# Patient Record
Sex: FEMALE
[De-identification: names, ages, dates, MRNs, and addresses within clinical notes are randomized per-mention and may not be internally consistent; named-entity substitution may affect disease eponyms.]

---

## 2018-05-16 LAB
ALBUMIN SERPL-MCNC: 4.2 G/DL (ref 3.5–5)
ALBUMIN/GLOB SERPL: 1.2 {RATIO} (ref 0.8–2)
ALP SERPL-CCNC: 81 IU/L (ref 40–150)
ALT SERPL-CCNC: 66 IU/L (ref 0–55)
ANION GAP SERPL CALC-SCNC: 14.8 MMOL/L (ref 8–16)
BASOPHILS # BLD AUTO: 0 10*3/UL (ref 0–0.1)
BASOPHILS NFR BLD AUTO: 0.6 % (ref 0–1)
BILIRUB UR QL: NEGATIVE
BUN SERPL-MCNC: 13 MG/DL (ref 7–26)
BUN/CREAT SERPL: 18 (ref 6–25)
CALCIUM SERPL-MCNC: 9.8 MG/DL (ref 8.4–10.2)
CHLORIDE SERPL-SCNC: 107 MMOL/L (ref 98–107)
CLARITY UR: (no result)
CO2 SERPL-SCNC: 25 MMOL/L (ref 22–29)
COLOR UR: YELLOW
DEPRECATED NEUTROPHILS # BLD AUTO: 3.4 10*3/UL (ref 2.1–6.9)
EGFRCR SERPLBLD CKD-EPI 2021: > 60 ML/MIN (ref 60–?)
EOSINOPHIL # BLD AUTO: 0.1 10*3/UL (ref 0–0.4)
EOSINOPHIL NFR BLD AUTO: 1.3 % (ref 0–6)
ERYTHROCYTE [DISTWIDTH] IN CORD BLOOD: 12 % (ref 11.7–14.4)
GLOBULIN PLAS-MCNC: 3.4 G/DL (ref 2.3–3.5)
GLUCOSE SERPLBLD-MCNC: 118 MG/DL (ref 74–118)
HCT VFR BLD AUTO: 40.6 % (ref 34.2–44.1)
HGB BLD-MCNC: 13.6 G/DL (ref 12–16)
KETONES UR QL STRIP.AUTO: NEGATIVE
LEUKOCYTE ESTERASE UR QL STRIP.AUTO: (no result)
LYMPHOCYTES # BLD: 2.7 10*3/UL (ref 1–3.2)
LYMPHOCYTES NFR BLD AUTO: 39.2 % (ref 18–39.1)
MCH RBC QN AUTO: 31.6 PG (ref 28–32)
MCHC RBC AUTO-ENTMCNC: 33.5 G/DL (ref 31–35)
MCV RBC AUTO: 94.4 FL (ref 81–99)
MONOCYTES # BLD AUTO: 0.6 10*3/UL (ref 0.2–0.8)
MONOCYTES NFR BLD AUTO: 8.2 % (ref 4.4–11.3)
NEUTS SEG NFR BLD AUTO: 50.4 % (ref 38.7–80)
NITRITE UR QL STRIP.AUTO: NEGATIVE
PLATELET # BLD AUTO: 285 X10E3/UL (ref 140–360)
POTASSIUM SERPL-SCNC: 4.8 MMOL/L (ref 3.5–5.1)
PROT UR QL STRIP.AUTO: NEGATIVE
RBC # BLD AUTO: 4.3 X10E6/UL (ref 3.6–5.1)
SODIUM SERPL-SCNC: 142 MMOL/L (ref 136–145)
SP GR UR STRIP: 1.02 (ref 1.01–1.02)
UROBILINOGEN UR STRIP-MCNC: 0.2 MG/DL (ref 0.2–1)

## 2018-05-16 NOTE — DIAGNOSTIC IMAGING REPORT
PROCEDURE:CHEST 2 VIEWS

TECHNIQUE:PA and lateral chest

INDICATION:Preoperative evaluation

COMPARISON:None.

 

FINDINGS:

Calcified granuloma in the left midlung. Otherwise, clear lungs. No 

pleural effusions. Normal cardiomediastinal silhouette and pulmonary 

vasculature. Intact skeleton with mild degenerative disc disease.

 

 

CONCLUSION:

No acute abnormality.

 

 

 

Dictated by:  Kwesi Cisneros M.D. on 5/16/2018 at 11:32     

Electronically approved by:  Kwesi Cisneros M.D. on 5/16/2018 at 

11:32

## 2018-05-18 ENCOUNTER — HOSPITAL ENCOUNTER (OUTPATIENT)
Dept: HOSPITAL 88 - OR | Age: 68
Discharge: HOME | End: 2018-05-18
Attending: SURGERY
Payer: COMMERCIAL

## 2018-05-18 DIAGNOSIS — Z01.810: ICD-10-CM

## 2018-05-18 DIAGNOSIS — E11.9: ICD-10-CM

## 2018-05-18 DIAGNOSIS — Z79.84: ICD-10-CM

## 2018-05-18 DIAGNOSIS — M19.90: ICD-10-CM

## 2018-05-18 DIAGNOSIS — K76.0: ICD-10-CM

## 2018-05-18 DIAGNOSIS — K21.9: ICD-10-CM

## 2018-05-18 DIAGNOSIS — Z01.812: ICD-10-CM

## 2018-05-18 DIAGNOSIS — Z01.818: ICD-10-CM

## 2018-05-18 DIAGNOSIS — I10: ICD-10-CM

## 2018-05-18 DIAGNOSIS — K80.10: Primary | ICD-10-CM

## 2018-05-18 DIAGNOSIS — K82.8: ICD-10-CM

## 2018-05-18 PROCEDURE — 71046 X-RAY EXAM CHEST 2 VIEWS: CPT

## 2018-05-18 PROCEDURE — 80053 COMPREHEN METABOLIC PANEL: CPT

## 2018-05-18 PROCEDURE — 88304 TISSUE EXAM BY PATHOLOGIST: CPT

## 2018-05-18 PROCEDURE — 93005 ELECTROCARDIOGRAM TRACING: CPT

## 2018-05-18 PROCEDURE — 81003 URINALYSIS AUTO W/O SCOPE: CPT

## 2018-05-18 PROCEDURE — 82948 REAGENT STRIP/BLOOD GLUCOSE: CPT

## 2018-05-18 PROCEDURE — 85025 COMPLETE CBC W/AUTO DIFF WBC: CPT

## 2018-05-18 PROCEDURE — 36415 COLL VENOUS BLD VENIPUNCTURE: CPT

## 2018-05-18 PROCEDURE — 47562 LAPAROSCOPIC CHOLECYSTECTOMY: CPT

## 2018-05-18 NOTE — OPERATIVE REPORT
DATE OF PROCEDURE:  May 18, 2018 



PREOPERATIVE DIAGNOSIS:  Cholecystitis and cholelithiasis.



POSTOPERATIVE DIAGNOSIS:  Cholecystitis and cholelithiasis.



OPERATION PERFORMED:  Laparoscopic cholecystectomy.  



ANESTHESIA:  General.



COMPLICATIONS:  None.



ESTIMATED BLOOD LOSS:  Minimal.



DESCRIPTION OF PROCEDURE:  With the patient lying in bed in the supine 

position, under good general endotracheal anesthesia, the abdomen was 

prepped with Betadine solution and draped in the usual manner.  A Veress 

needle was introduced into the umbilicus, and pneumoperitoneum was 

established without any difficulty.  An 11-mm trocar was placed into the 

umbilicus, and a 10-mm video laparoscope was placed into the intraabdominal 

cavity.  Under direct vision, three 5-mm trocars were placed in the right 

subcostal region.  Video laparoscopy at this point revealed the liver to 

have significant fatty infiltration.  The gallbladder was totally covered 

up with adhesions all the way up to the top.  The rest of the abdominal 

exploration was otherwise within normal limits.  The adhesions to the 

gallbladder were then slowly and carefully taken down.  The peritoneum 

overlying the neck of the gallbladder was then opened, and the cystic duct 

was identified.  The cystic duct was followed to its junction with the 

common duct.  The cystic duct was then circumferentially dissected away 

from the common duct, doubly clipped and divided.  The cystic artery was 

similarly doubly clipped and divided.  The gallbladder was then slowly and 

carefully taken off the liver bed using the cautery scissors, and perfect 

hemostasis was ascertained.  The gallbladder was placed in a pouch and 

removed through the umbilicus without any difficulty.  Video laparoscopy 

was then again carried out.  The liver bed was found to be perfectly dry.  

All of the excess fluid was aspirated.  The pneumoperitoneum was evacuated, 

and all the trocars were removed under direct vision.  The midline fascia 

at the umbilicus was then closed with figure-of-eight of #0 Vicryl.  All 

layers were infiltrated on the way out with solution of 1/4 percent 

Marcaine.  Subcutaneous tissue was approximated with 3-0 Vicryl, and the 

skin was closed with subcuticular 5-0 Vicryl.  Benzoin, Steri-Strips and 

Band-Aids were applied.  The sponge, lap and needle count was correct.  

Patient tolerated the procedure well and returned to the recovery room in 

stable condition.  



  





DD:  05/18/2018 11:58

DT:  05/18/2018 12:54

Job#:  [id number]

## 2018-05-18 NOTE — XMS REPORT
Patient Summary Document

 Created on: 2018



KELBY UGARTE

External Reference #: [id number]

: 1950

Sex: Female



Demographics







 Address  [address] [id number]

 

 Home Phone  [phone]

 

 Preferred Language  Unknown

 

 Marital Status  Unknown

 

 Gnosticist Affiliation  Unknown

 

 Race  Unknown

 

 Additional Race(s)  

 

 Ethnic Group  Unknown





Author







 [facility]  [facility]

 

 Address  Unknown

 

 Phone  Unavailable







Care Team Providers







 Care Team Member Name  Role  Phone

 

 SHERLY VAIL  Unavailable  Unavailable







Problems

This patient has no known problems.



Allergies, Adverse Reactions, Alerts

This patient has no known allergies or adverse reactions.



Medications

This patient has no known medications.



Results







 Test Description  Test Time  Test Comments  Text Results  Atomic Results  
Result Comments









 CHEST 2 VIEWS            [place] [address]      Patient Name: KELBY UGARTE 
  MR #: [id number]    : 1950 Age/Sex: 67/F  Acct #: [id number] Req #
: [id number]  Adm Physician:     Ordered by: SHERLY VAIL MD  Report #: 
[id number]   Location: OR  Room/Bed:     ________________________________________
___________________________________________________________    Procedure: [id number]
[id number] DX/CHEST 2 VIEWS  Exam Date: 18                            Exam Time
: 1000       REPORT STATUS: Signed    PROCEDURE:   CHEST 2 VIEWS   TECHNIQUE:   
PA and lateral chest   INDICATION:   Preoperative evaluation   COMPARISON:   
None.       FINDINGS:   Calcified granuloma in the left midlung. Otherwise, 
clear lungs. No    pleural effusions. Normal cardiomediastinal silhouette and 
pulmonary    vasculature. Intact skeleton with mild degenerative disc disease. 
          CONCLUSION:   No acute abnormality.               Dictated by:  Noble Cisneros M.D. on 2018 at 11:32        Electronically approved by:  
Noble Cisneros M.D. on 2018 at    11:32                Dictated By: 
NOBLE CISNEROS MD  Electronically Signed By: NOBLE CISNEROS MD on 18 1132
  Transcribed By: VIV on 18 1132       COPY TO:   SHERLY VAIL MD

## 2021-10-20 ENCOUNTER — HOSPITAL ENCOUNTER (OUTPATIENT)
Dept: HOSPITAL 88 - RAD | Age: 71
End: 2021-10-20
Attending: FAMILY MEDICINE
Payer: MEDICARE

## 2021-10-20 DIAGNOSIS — M25.562: Primary | ICD-10-CM
